# Patient Record
Sex: MALE | Race: WHITE | NOT HISPANIC OR LATINO | URBAN - METROPOLITAN AREA
[De-identification: names, ages, dates, MRNs, and addresses within clinical notes are randomized per-mention and may not be internally consistent; named-entity substitution may affect disease eponyms.]

---

## 2017-07-21 ENCOUNTER — OFFICE VISIT (OUTPATIENT)
Dept: URGENT CARE | Facility: CLINIC | Age: 47
End: 2017-07-21

## 2017-07-21 VITALS
HEIGHT: 69 IN | HEART RATE: 67 BPM | RESPIRATION RATE: 12 BRPM | SYSTOLIC BLOOD PRESSURE: 151 MMHG | OXYGEN SATURATION: 98 % | WEIGHT: 210 LBS | BODY MASS INDEX: 31.1 KG/M2 | TEMPERATURE: 98 F | DIASTOLIC BLOOD PRESSURE: 104 MMHG

## 2017-07-21 DIAGNOSIS — Z53.20 PROCEDURE NOT CARRIED OUT BECAUSE OF PATIENT'S DECISION: Primary | ICD-10-CM

## 2017-07-21 PROCEDURE — 99499 UNLISTED E&M SERVICE: CPT | Mod: S$GLB,,, | Performed by: SURGERY

## 2017-07-21 NOTE — PROGRESS NOTES
"Subjective:       Patient ID: Allie Grubbs is a 46 y.o. male.    Vitals:  height is 5' 9" (1.753 m) and weight is 95.3 kg (210 lb). His oral temperature is 97.7 °F (36.5 °C). His blood pressure is 151/104 (abnormal) and his pulse is 67. His respiration is 12 and oxygen saturation is 98%.     Chief Complaint: Cyst and Abscess    Cyst   This is a chronic problem. The current episode started more than 1 month ago. The problem occurs constantly. The problem has been gradually worsening. Associated symptoms include joint swelling and a visual change. Nothing aggravates the symptoms. He has tried nothing for the symptoms. The treatment provided no relief.   Abscess   Location:  Shoulder/arm  Treatments Tried:  Warm compresses and warm water soaks  Relieved by:  Nothing  Worsened by:  Nothing    Review of Systems   Musculoskeletal: Positive for joint swelling.       Objective:      Physical Exam    Assessment:       No diagnosis found.    Plan:         There are no diagnoses linked to this encounter.        The patient left the office before the visit was finished.  "

## 2021-03-26 ENCOUNTER — IMMUNIZATION (OUTPATIENT)
Dept: PRIMARY CARE CLINIC | Facility: CLINIC | Age: 51
End: 2021-03-26

## 2021-03-26 DIAGNOSIS — Z23 NEED FOR VACCINATION: Primary | ICD-10-CM

## 2021-03-26 PROCEDURE — 91300 PR SARS-COV- 2 COVID-19 VACCINE, NO PRSV, 30MCG/0.3ML, IM: CPT | Mod: S$GLB,,, | Performed by: INTERNAL MEDICINE

## 2021-03-26 PROCEDURE — 0001A PR IMMUNIZ ADMIN, SARS-COV-2 COVID-19 VACC, 30MCG/0.3ML, 1ST DOSE: ICD-10-PCS | Mod: CV19,S$GLB,, | Performed by: INTERNAL MEDICINE

## 2021-03-26 PROCEDURE — 91300 PR SARS-COV- 2 COVID-19 VACCINE, NO PRSV, 30MCG/0.3ML, IM: ICD-10-PCS | Mod: S$GLB,,, | Performed by: INTERNAL MEDICINE

## 2021-03-26 PROCEDURE — 0001A PR IMMUNIZ ADMIN, SARS-COV-2 COVID-19 VACC, 30MCG/0.3ML, 1ST DOSE: CPT | Mod: CV19,S$GLB,, | Performed by: INTERNAL MEDICINE

## 2021-03-26 RX ADMIN — Medication 0.3 ML: at 11:03

## 2021-04-18 ENCOUNTER — IMMUNIZATION (OUTPATIENT)
Dept: PRIMARY CARE CLINIC | Facility: CLINIC | Age: 51
End: 2021-04-18

## 2021-04-18 DIAGNOSIS — Z23 NEED FOR VACCINATION: Primary | ICD-10-CM

## 2021-04-18 PROCEDURE — 91300 PR SARS-COV- 2 COVID-19 VACCINE, NO PRSV, 30MCG/0.3ML, IM: CPT | Mod: S$GLB,,, | Performed by: INTERNAL MEDICINE

## 2021-04-18 PROCEDURE — 91300 PR SARS-COV- 2 COVID-19 VACCINE, NO PRSV, 30MCG/0.3ML, IM: ICD-10-PCS | Mod: S$GLB,,, | Performed by: INTERNAL MEDICINE

## 2021-04-18 PROCEDURE — 0002A PR IMMUNIZ ADMIN, SARS-COV-2 COVID-19 VACC, 30MCG/0.3ML, 2ND DOSE: CPT | Mod: CV19,S$GLB,, | Performed by: INTERNAL MEDICINE

## 2021-04-18 PROCEDURE — 0002A PR IMMUNIZ ADMIN, SARS-COV-2 COVID-19 VACC, 30MCG/0.3ML, 2ND DOSE: ICD-10-PCS | Mod: CV19,S$GLB,, | Performed by: INTERNAL MEDICINE

## 2021-04-18 RX ADMIN — Medication 0.3 ML: at 07:04

## 2022-06-27 ENCOUNTER — HOSPITAL ENCOUNTER (INPATIENT)
Facility: HOSPITAL | Age: 52
LOS: 1 days | Discharge: LEFT BEFORE DISCHARGE | DRG: 292 | End: 2022-06-27
Attending: EMERGENCY MEDICINE | Admitting: HOSPITALIST

## 2022-06-27 VITALS
TEMPERATURE: 98 F | HEART RATE: 82 BPM | WEIGHT: 180.5 LBS | HEIGHT: 69 IN | SYSTOLIC BLOOD PRESSURE: 133 MMHG | BODY MASS INDEX: 26.73 KG/M2 | DIASTOLIC BLOOD PRESSURE: 90 MMHG | RESPIRATION RATE: 16 BRPM | OXYGEN SATURATION: 94 %

## 2022-06-27 DIAGNOSIS — R06.09 DYSPNEA ON EXERTION: ICD-10-CM

## 2022-06-27 DIAGNOSIS — I50.9 NEW ONSET OF CONGESTIVE HEART FAILURE: ICD-10-CM

## 2022-06-27 DIAGNOSIS — R06.02 SOB (SHORTNESS OF BREATH): ICD-10-CM

## 2022-06-27 PROBLEM — J44.1 CHRONIC OBSTRUCTIVE PULMONARY DISEASE WITH ACUTE EXACERBATION: Status: ACTIVE | Noted: 2022-06-27

## 2022-06-27 PROBLEM — Z72.0 TOBACCO ABUSE: Status: ACTIVE | Noted: 2022-06-27

## 2022-06-27 PROBLEM — I50.41 ACUTE COMBINED SYSTOLIC AND DIASTOLIC CONGESTIVE HEART FAILURE: Status: ACTIVE | Noted: 2022-06-27

## 2022-06-27 LAB
ALBUMIN SERPL BCP-MCNC: 3.7 G/DL (ref 3.5–5.2)
ALLENS TEST: ABNORMAL
ALP SERPL-CCNC: 100 U/L (ref 55–135)
ALT SERPL W/O P-5'-P-CCNC: 13 U/L (ref 10–44)
ANION GAP SERPL CALC-SCNC: 11 MMOL/L (ref 8–16)
AST SERPL-CCNC: 13 U/L (ref 10–40)
AV INDEX (PROSTH): 0.67
AV MEAN GRADIENT: 4 MMHG
AV PEAK GRADIENT: 5 MMHG
AV VALVE AREA: 1.99 CM2
AV VELOCITY RATIO: 0.78
BASOPHILS # BLD AUTO: 0.07 K/UL (ref 0–0.2)
BASOPHILS NFR BLD: 0.6 % (ref 0–1.9)
BILIRUB SERPL-MCNC: 0.6 MG/DL (ref 0.1–1)
BNP SERPL-MCNC: 894 PG/ML (ref 0–99)
BSA FOR ECHO PROCEDURE: 1.97 M2
BUN SERPL-MCNC: 12 MG/DL (ref 6–20)
CALCIUM SERPL-MCNC: 9.4 MG/DL (ref 8.7–10.5)
CHLORIDE SERPL-SCNC: 105 MMOL/L (ref 95–110)
CO2 SERPL-SCNC: 22 MMOL/L (ref 23–29)
CREAT SERPL-MCNC: 0.9 MG/DL (ref 0.5–1.4)
CRP SERPL-MCNC: 19 MG/L (ref 0–8.2)
CTP QC/QA: YES
CV ECHO LV RWT: 0.33 CM
DIFFERENTIAL METHOD: ABNORMAL
DOP CALC AO PEAK VEL: 1.16 M/S
DOP CALC AO VTI: 21.91 CM
DOP CALC LVOT AREA: 3 CM2
DOP CALC LVOT DIAMETER: 1.95 CM
DOP CALC LVOT PEAK VEL: 0.91 M/S
DOP CALC LVOT STROKE VOLUME: 43.52 CM3
DOP CALCLVOT PEAK VEL VTI: 14.58 CM
E WAVE DECELERATION TIME: 123.22 MSEC
E/A RATIO: 3.03
ECHO LV POSTERIOR WALL: 0.93 CM (ref 0.6–1.1)
EJECTION FRACTION: 25 %
EOSINOPHIL # BLD AUTO: 0.2 K/UL (ref 0–0.5)
EOSINOPHIL NFR BLD: 1.9 % (ref 0–8)
ERYTHROCYTE [DISTWIDTH] IN BLOOD BY AUTOMATED COUNT: 13.4 % (ref 11.5–14.5)
EST. GFR  (AFRICAN AMERICAN): >60 ML/MIN/1.73 M^2
EST. GFR  (NON AFRICAN AMERICAN): >60 ML/MIN/1.73 M^2
ESTIMATED AVG GLUCOSE: 97 MG/DL (ref 68–131)
FRACTIONAL SHORTENING: 10 % (ref 28–44)
GLUCOSE SERPL-MCNC: 160 MG/DL (ref 70–110)
HBA1C MFR BLD: 5 % (ref 4–5.6)
HCO3 UR-SCNC: 26.2 MMOL/L (ref 24–28)
HCT VFR BLD AUTO: 46.2 % (ref 40–54)
HGB BLD-MCNC: 15.6 G/DL (ref 14–18)
IMM GRANULOCYTES # BLD AUTO: 0.04 K/UL (ref 0–0.04)
IMM GRANULOCYTES NFR BLD AUTO: 0.3 % (ref 0–0.5)
INTERVENTRICULAR SEPTUM: 1.35 CM (ref 0.6–1.1)
IVRT: 96.89 MSEC
LA MAJOR: 6.56 CM
LA MINOR: 7.24 CM
LA WIDTH: 4.67 CM
LACTATE SERPL-SCNC: 2.8 MMOL/L (ref 0.5–2.2)
LEFT ATRIUM SIZE: 5.64 CM
LEFT ATRIUM VOLUME INDEX: 79 ML/M2
LEFT ATRIUM VOLUME: 154.1 CM3
LEFT INTERNAL DIMENSION IN SYSTOLE: 5 CM (ref 2.1–4)
LEFT VENTRICLE DIASTOLIC VOLUME INDEX: 77.61 ML/M2
LEFT VENTRICLE DIASTOLIC VOLUME: 151.33 ML
LEFT VENTRICLE MASS INDEX: 133 G/M2
LEFT VENTRICLE SYSTOLIC VOLUME INDEX: 60.6 ML/M2
LEFT VENTRICLE SYSTOLIC VOLUME: 118.11 ML
LEFT VENTRICULAR INTERNAL DIMENSION IN DIASTOLE: 5.56 CM (ref 3.5–6)
LEFT VENTRICULAR MASS: 258.53 G
LV LATERAL E/E' RATIO: 12.44 M/S
LYMPHOCYTES # BLD AUTO: 1.9 K/UL (ref 1–4.8)
LYMPHOCYTES NFR BLD: 15.7 % (ref 18–48)
MAGNESIUM SERPL-MCNC: 2 MG/DL (ref 1.6–2.6)
MCH RBC QN AUTO: 31.4 PG (ref 27–31)
MCHC RBC AUTO-ENTMCNC: 33.8 G/DL (ref 32–36)
MCV RBC AUTO: 93 FL (ref 82–98)
MONOCYTES # BLD AUTO: 0.5 K/UL (ref 0.3–1)
MONOCYTES NFR BLD: 3.8 % (ref 4–15)
MV PEAK A VEL: 0.37 M/S
MV PEAK E VEL: 1.12 M/S
NEUTROPHILS # BLD AUTO: 9.4 K/UL (ref 1.8–7.7)
NEUTROPHILS NFR BLD: 77.7 % (ref 38–73)
NRBC BLD-RTO: 0 /100 WBC
PCO2 BLDA: 52.5 MMHG (ref 35–45)
PH SMN: 7.31 [PH] (ref 7.35–7.45)
PLATELET # BLD AUTO: 138 K/UL (ref 150–450)
PMV BLD AUTO: 11.4 FL (ref 9.2–12.9)
PO2 BLDA: 19 MMHG (ref 40–60)
POC BE: -2 MMOL/L
POC SATURATED O2: 24 % (ref 95–100)
POC TCO2: 28 MMOL/L (ref 24–29)
POTASSIUM SERPL-SCNC: 3.8 MMOL/L (ref 3.5–5.1)
PROCALCITONIN SERPL IA-MCNC: 0.02 NG/ML
PROT SERPL-MCNC: 7.4 G/DL (ref 6–8.4)
PV PEAK VELOCITY: 0.66 CM/S
RA MAJOR: 5.79 CM
RA PRESSURE: 8 MMHG
RA WIDTH: 4.88 CM
RBC # BLD AUTO: 4.97 M/UL (ref 4.6–6.2)
RIGHT VENTRICULAR END-DIASTOLIC DIMENSION: 4.04 CM
SAMPLE: ABNORMAL
SARS-COV-2 RDRP RESP QL NAA+PROBE: NEGATIVE
SINUS: 3.34 CM
SITE: ABNORMAL
SODIUM SERPL-SCNC: 138 MMOL/L (ref 136–145)
STJ: 2.41 CM
TDI LATERAL: 0.09 M/S
TRICUSPID ANNULAR PLANE SYSTOLIC EXCURSION: 1.68 CM
TROPONIN I SERPL DL<=0.01 NG/ML-MCNC: 0.01 NG/ML (ref 0–0.03)
TROPONIN I SERPL DL<=0.01 NG/ML-MCNC: 0.01 NG/ML (ref 0–0.03)
WBC # BLD AUTO: 12.14 K/UL (ref 3.9–12.7)

## 2022-06-27 PROCEDURE — 25000003 PHARM REV CODE 250: Performed by: HOSPITALIST

## 2022-06-27 PROCEDURE — U0002 COVID-19 LAB TEST NON-CDC: HCPCS | Performed by: EMERGENCY MEDICINE

## 2022-06-27 PROCEDURE — 83880 ASSAY OF NATRIURETIC PEPTIDE: CPT | Performed by: EMERGENCY MEDICINE

## 2022-06-27 PROCEDURE — 96374 THER/PROPH/DIAG INJ IV PUSH: CPT

## 2022-06-27 PROCEDURE — 80053 COMPREHEN METABOLIC PANEL: CPT | Performed by: EMERGENCY MEDICINE

## 2022-06-27 PROCEDURE — 93005 ELECTROCARDIOGRAM TRACING: CPT

## 2022-06-27 PROCEDURE — 83735 ASSAY OF MAGNESIUM: CPT | Performed by: EMERGENCY MEDICINE

## 2022-06-27 PROCEDURE — 63600175 PHARM REV CODE 636 W HCPCS: Performed by: HOSPITALIST

## 2022-06-27 PROCEDURE — 99223 PR INITIAL HOSPITAL CARE,LEVL III: ICD-10-PCS | Mod: 25,,, | Performed by: INTERNAL MEDICINE

## 2022-06-27 PROCEDURE — 84145 PROCALCITONIN (PCT): CPT | Performed by: EMERGENCY MEDICINE

## 2022-06-27 PROCEDURE — 84484 ASSAY OF TROPONIN QUANT: CPT | Mod: 91 | Performed by: EMERGENCY MEDICINE

## 2022-06-27 PROCEDURE — 99900035 HC TECH TIME PER 15 MIN (STAT)

## 2022-06-27 PROCEDURE — 25000242 PHARM REV CODE 250 ALT 637 W/ HCPCS: Performed by: HOSPITALIST

## 2022-06-27 PROCEDURE — 93010 EKG 12-LEAD: ICD-10-PCS | Mod: ,,, | Performed by: INTERNAL MEDICINE

## 2022-06-27 PROCEDURE — 93010 ELECTROCARDIOGRAM REPORT: CPT | Mod: ,,, | Performed by: INTERNAL MEDICINE

## 2022-06-27 PROCEDURE — 94640 AIRWAY INHALATION TREATMENT: CPT

## 2022-06-27 PROCEDURE — 11000001 HC ACUTE MED/SURG PRIVATE ROOM

## 2022-06-27 PROCEDURE — 83036 HEMOGLOBIN GLYCOSYLATED A1C: CPT | Performed by: EMERGENCY MEDICINE

## 2022-06-27 PROCEDURE — 84484 ASSAY OF TROPONIN QUANT: CPT | Performed by: INTERNAL MEDICINE

## 2022-06-27 PROCEDURE — 96375 TX/PRO/DX INJ NEW DRUG ADDON: CPT

## 2022-06-27 PROCEDURE — 85025 COMPLETE CBC W/AUTO DIFF WBC: CPT | Performed by: EMERGENCY MEDICINE

## 2022-06-27 PROCEDURE — 82803 BLOOD GASES ANY COMBINATION: CPT

## 2022-06-27 PROCEDURE — 63600175 PHARM REV CODE 636 W HCPCS: Performed by: EMERGENCY MEDICINE

## 2022-06-27 PROCEDURE — 99223 1ST HOSP IP/OBS HIGH 75: CPT | Mod: 25,,, | Performed by: INTERNAL MEDICINE

## 2022-06-27 PROCEDURE — 99285 EMERGENCY DEPT VISIT HI MDM: CPT | Mod: 25

## 2022-06-27 PROCEDURE — 86140 C-REACTIVE PROTEIN: CPT | Performed by: EMERGENCY MEDICINE

## 2022-06-27 PROCEDURE — 83605 ASSAY OF LACTIC ACID: CPT | Performed by: EMERGENCY MEDICINE

## 2022-06-27 PROCEDURE — 94761 N-INVAS EAR/PLS OXIMETRY MLT: CPT

## 2022-06-27 PROCEDURE — 25000242 PHARM REV CODE 250 ALT 637 W/ HCPCS: Performed by: EMERGENCY MEDICINE

## 2022-06-27 RX ORDER — LEVALBUTEROL 1.25 MG/.5ML
1.25 SOLUTION, CONCENTRATE RESPIRATORY (INHALATION) EVERY 8 HOURS
Status: DISCONTINUED | OUTPATIENT
Start: 2022-06-27 | End: 2022-06-27 | Stop reason: HOSPADM

## 2022-06-27 RX ORDER — FUROSEMIDE 10 MG/ML
40 INJECTION INTRAMUSCULAR; INTRAVENOUS
Status: COMPLETED | OUTPATIENT
Start: 2022-06-27 | End: 2022-06-27

## 2022-06-27 RX ORDER — HYDRALAZINE HYDROCHLORIDE 20 MG/ML
5 INJECTION INTRAMUSCULAR; INTRAVENOUS
Status: DISCONTINUED | OUTPATIENT
Start: 2022-06-27 | End: 2022-06-27

## 2022-06-27 RX ORDER — ACETAMINOPHEN, ASPIRIN (NSAID), AND CAFFEINE 250; 250; 65 MG/1; MG/1; MG/1
1 TABLET, FILM COATED ORAL EVERY 6 HOURS PRN
COMMUNITY

## 2022-06-27 RX ORDER — FAMOTIDINE 20 MG/1
20 TABLET, FILM COATED ORAL 2 TIMES DAILY
Status: DISCONTINUED | OUTPATIENT
Start: 2022-06-27 | End: 2022-06-27 | Stop reason: HOSPADM

## 2022-06-27 RX ORDER — METHYLPREDNISOLONE SOD SUCC 125 MG
80 VIAL (EA) INJECTION
Status: COMPLETED | OUTPATIENT
Start: 2022-06-27 | End: 2022-06-27

## 2022-06-27 RX ORDER — IPRATROPIUM BROMIDE 0.5 MG/2.5ML
1 SOLUTION RESPIRATORY (INHALATION) ONCE
Status: COMPLETED | OUTPATIENT
Start: 2022-06-27 | End: 2022-06-27

## 2022-06-27 RX ORDER — ALBUTEROL SULFATE 2.5 MG/.5ML
5 SOLUTION RESPIRATORY (INHALATION)
Status: COMPLETED | OUTPATIENT
Start: 2022-06-27 | End: 2022-06-27

## 2022-06-27 RX ORDER — SODIUM CHLORIDE 0.9 % (FLUSH) 0.9 %
10 SYRINGE (ML) INJECTION
Status: DISCONTINUED | OUTPATIENT
Start: 2022-06-27 | End: 2022-06-27 | Stop reason: HOSPADM

## 2022-06-27 RX ORDER — PREDNISONE 50 MG/1
50 TABLET ORAL DAILY
Status: DISCONTINUED | OUTPATIENT
Start: 2022-06-27 | End: 2022-06-27 | Stop reason: HOSPADM

## 2022-06-27 RX ORDER — ENOXAPARIN SODIUM 100 MG/ML
40 INJECTION SUBCUTANEOUS EVERY 24 HOURS
Status: DISCONTINUED | OUTPATIENT
Start: 2022-06-27 | End: 2022-06-27 | Stop reason: HOSPADM

## 2022-06-27 RX ADMIN — LEVALBUTEROL 1.25 MG: 1.25 SOLUTION, CONCENTRATE RESPIRATORY (INHALATION) at 04:06

## 2022-06-27 RX ADMIN — ALBUTEROL SULFATE 5 MG: 2.5 SOLUTION RESPIRATORY (INHALATION) at 06:06

## 2022-06-27 RX ADMIN — IPRATROPIUM BROMIDE 1 MG: 0.5 SOLUTION RESPIRATORY (INHALATION) at 06:06

## 2022-06-27 RX ADMIN — FUROSEMIDE 40 MG: 10 INJECTION, SOLUTION INTRAMUSCULAR; INTRAVENOUS at 08:06

## 2022-06-27 RX ADMIN — METHYLPREDNISOLONE SODIUM SUCCINATE 80 MG: 125 INJECTION, POWDER, FOR SOLUTION INTRAMUSCULAR; INTRAVENOUS at 07:06

## 2022-06-27 RX ADMIN — PREDNISONE 50 MG: 5 TABLET ORAL at 11:06

## 2022-06-27 RX ADMIN — FAMOTIDINE 20 MG: 20 TABLET ORAL at 11:06

## 2022-06-27 NOTE — ED TRIAGE NOTES
Pt came in via triage Cc generalized weakness , sob with cough x 2 weeks. Pt state he smokes everyday

## 2022-06-27 NOTE — SUBJECTIVE & OBJECTIVE
History reviewed. No pertinent past medical history.    History reviewed. No pertinent surgical history.    Review of patient's allergies indicates:   Allergen Reactions    Penicillins Rash       No current facility-administered medications on file prior to encounter.     Current Outpatient Medications on File Prior to Encounter   Medication Sig    aspirin-acetaminophen-caffeine 250-250-65 mg (EXCEDRIN EXTRA STRENGTH) 250-250-65 mg per tablet Take 1 tablet by mouth every 6 (six) hours as needed for Pain.     Family History    None       Tobacco Use    Smoking status: Current Every Day Smoker    Smokeless tobacco: Never Used   Substance and Sexual Activity    Alcohol use: No    Drug use: No    Sexual activity: Not on file     Review of Systems   Constitutional: Negative.   HENT: Negative.     Eyes: Negative.    Cardiovascular:  Positive for dyspnea on exertion.   Respiratory:  Positive for shortness of breath.    Endocrine: Negative.    Hematologic/Lymphatic: Negative.    Skin: Negative.    Musculoskeletal: Negative.    Gastrointestinal: Negative.    Genitourinary: Negative.    Neurological: Negative.    Psychiatric/Behavioral: Negative.     Allergic/Immunologic: Negative.    Objective:     Vital Signs (Most Recent):  Temp: 97.7 °F (36.5 °C) (06/27/22 1257)  Pulse: 90 (06/27/22 1257)  Resp: 18 (06/27/22 1257)  BP: (!) 130/91 (06/27/22 1257)  SpO2: (!) 94 % (06/27/22 1257)   Vital Signs (24h Range):  Temp:  [97.7 °F (36.5 °C)-97.9 °F (36.6 °C)] 97.7 °F (36.5 °C)  Pulse:  [] 90  Resp:  [10-23] 18  SpO2:  [93 %-97 %] 94 %  BP: (128-162)/() 130/91     Weight: 81.9 kg (180 lb 8 oz)  Body mass index is 26.64 kg/m².    SpO2: (!) 94 %  O2 Device (Oxygen Therapy): (S) room air    No intake or output data in the 24 hours ending 06/27/22 1321    Lines/Drains/Airways       Peripheral Intravenous Line  Duration                  Peripheral IV - Single Lumen 06/27/22 0557 20 G Right Antecubital <1 day                     Physical Exam  Vitals reviewed.   Constitutional:       Appearance: He is well-developed.   HENT:      Head: Normocephalic.   Eyes:      Conjunctiva/sclera: Conjunctivae normal.      Pupils: Pupils are equal, round, and reactive to light.   Cardiovascular:      Rate and Rhythm: Normal rate and regular rhythm.      Heart sounds: Normal heart sounds.   Pulmonary:      Effort: Pulmonary effort is normal.      Breath sounds: Normal breath sounds.   Abdominal:      General: Bowel sounds are normal.      Palpations: Abdomen is soft.   Musculoskeletal:      Cervical back: Normal range of motion and neck supple.   Skin:     General: Skin is warm.   Neurological:      Mental Status: He is alert and oriented to person, place, and time.       Significant Labs: BMP:   Recent Labs   Lab 06/27/22  0610   *      K 3.8      CO2 22*   BUN 12   CREATININE 0.9   CALCIUM 9.4   MG 2.0   , CMP   Recent Labs   Lab 06/27/22  0610      K 3.8      CO2 22*   *   BUN 12   CREATININE 0.9   CALCIUM 9.4   PROT 7.4   ALBUMIN 3.7   BILITOT 0.6   ALKPHOS 100   AST 13   ALT 13   ANIONGAP 11   ESTGFRAFRICA >60   EGFRNONAA >60   , CBC   Recent Labs   Lab 06/27/22  0610   WBC 12.14   HGB 15.6   HCT 46.2   *   , INR No results for input(s): INR, PROTIME in the last 48 hours., Lipid Panel No results for input(s): CHOL, HDL, LDLCALC, TRIG, CHOLHDL in the last 48 hours., Troponin   Recent Labs   Lab 06/27/22  0610 06/27/22  1051   TROPONINI 0.009 0.006   , and All pertinent lab results from the last 24 hours have been reviewed.    Significant Imaging: Echocardiogram: Transthoracic echo (TTE) complete (Cupid Only):   Results for orders placed or performed during the hospital encounter of 06/27/22   Echo   Result Value Ref Range    BSA 1.97 m2    LV LATERAL E/E' RATIO 12.44 m/s    LA WIDTH 4.67 cm    TDI LATERAL 0.09 m/s    PV PEAK VELOCITY 0.66 cm/s    LVIDd 5.56 3.5 - 6.0 cm    IVS 1.35 (A) 0.6 - 1.1 cm     Posterior Wall 0.93 0.6 - 1.1 cm    LVIDs 5.00 (A) 2.1 - 4.0 cm    FS 10 28 - 44 %    LA volume 154.10 cm3    Sinus 3.34 cm    STJ 2.41 cm    LV mass 258.53 g    LA size 5.64 cm    RVDD 4.04 cm    TAPSE 1.68 cm    Left Ventricle Relative Wall Thickness 0.33 cm    AV mean gradient 4 mmHg    AV valve area 1.99 cm2    AV Velocity Ratio 0.78     AV index (prosthetic) 0.67     E/A ratio 3.03     E wave deceleration time 123.22 msec    IVRT 96.89 msec    LVOT diameter 1.95 cm    LVOT area 3.0 cm2    LVOT peak alfie 0.91 m/s    LVOT peak VTI 14.58 cm    Ao peak alfie 1.16 m/s    Ao VTI 21.91 cm    LVOT stroke volume 43.52 cm3    AV peak gradient 5 mmHg    MV Peak E Alfie 1.12 m/s    MV Peak A Alfie 0.37 m/s    LV Systolic Volume 118.11 mL    LV Systolic Volume Index 60.6 mL/m2    LV Diastolic Volume 151.33 mL    LV Diastolic Volume Index 77.61 mL/m2    LA Volume Index 79.0 mL/m2    LV Mass Index 133 g/m2    RA Major Axis 5.79 cm    Left Atrium Minor Axis 7.24 cm    Left Atrium Major Axis 6.56 cm    RA Width 4.88 cm    Right Atrial Pressure (from IVC) 8 mmHg    EF 25 %    Narrative    · The left ventricle is normal in size with severely decreased systolic   function.  · Severe left atrial enlargement.  · The estimated ejection fraction is 25%.  · Grade III left ventricular diastolic dysfunction.  · Mild right ventricular enlargement.  · Mild right atrial enlargement.  · Intermediate central venous pressure (8 mmHg).

## 2022-06-27 NOTE — SUBJECTIVE & OBJECTIVE
History reviewed. No pertinent past medical history.    History reviewed. No pertinent surgical history.    Review of patient's allergies indicates:   Allergen Reactions    Penicillins Rash       No current facility-administered medications on file prior to encounter.     No current outpatient medications on file prior to encounter.     Family History    None       Tobacco Use    Smoking status: Current Every Day Smoker    Smokeless tobacco: Never Used   Substance and Sexual Activity    Alcohol use: No    Drug use: No    Sexual activity: Not on file     Review of Systems   Constitutional:  Positive for activity change and appetite change.   HENT:  Negative for congestion and dental problem.    Eyes:  Negative for discharge and itching.   Respiratory:  Positive for shortness of breath.    Cardiovascular:  Negative for chest pain and leg swelling.   Gastrointestinal:  Negative for abdominal distention and abdominal pain.   Endocrine: Negative for cold intolerance and heat intolerance.   Genitourinary:  Negative for difficulty urinating and dysuria.   Musculoskeletal:  Negative for arthralgias and back pain.   Skin:  Negative for color change and pallor.   Allergic/Immunologic: Negative for food allergies.   Neurological:  Negative for dizziness and facial asymmetry.   Hematological:  Negative for adenopathy. Does not bruise/bleed easily.   Psychiatric/Behavioral:  Negative for agitation and behavioral problems.    Objective:     Vital Signs (Most Recent):  Temp: 97.9 °F (36.6 °C) (06/27/22 0531)  Pulse: 94 (06/27/22 1002)  Resp: 14 (06/27/22 1002)  BP: (!) 146/103 (06/27/22 1002)  SpO2: 96 % (06/27/22 1002)   Vital Signs (24h Range):  Temp:  [97.9 °F (36.6 °C)] 97.9 °F (36.6 °C)  Pulse:  [] 94  Resp:  [14-23] 14  SpO2:  [93 %-97 %] 96 %  BP: (146-162)/(101-129) 146/103     Weight: 79.4 kg (175 lb)  Body mass index is 25.84 kg/m².    Physical Exam  Constitutional:       Appearance: Normal appearance.    HENT:      Head: Normocephalic and atraumatic.      Nose: Nose normal.      Mouth/Throat:      Mouth: Mucous membranes are dry.   Eyes:      Extraocular Movements: Extraocular movements intact.      Pupils: Pupils are equal, round, and reactive to light.   Cardiovascular:      Rate and Rhythm: Normal rate and regular rhythm.   Pulmonary:      Effort: Pulmonary effort is normal.      Breath sounds: Rales present.   Abdominal:      General: Abdomen is flat. There is no distension.      Palpations: Abdomen is soft.      Tenderness: There is no abdominal tenderness.   Musculoskeletal:         General: No swelling or deformity.      Cervical back: Normal range of motion and neck supple.   Skin:     General: Skin is warm.      Coloration: Skin is not jaundiced.      Findings: No bruising.   Neurological:      Mental Status: He is alert and oriented to person, place, and time.      Cranial Nerves: No cranial nerve deficit.   Psychiatric:         Mood and Affect: Mood normal.         Behavior: Behavior normal.         CRANIAL NERVES     CN III, IV, VI   Pupils are equal, round, and reactive to light.     Significant Labs: All pertinent labs within the past 24 hours have been reviewed.  BMP:   Recent Labs   Lab 06/27/22  0610   *      K 3.8      CO2 22*   BUN 12   CREATININE 0.9   CALCIUM 9.4   MG 2.0     CBC:   Recent Labs   Lab 06/27/22  0610   WBC 12.14   HGB 15.6   HCT 46.2   *     CMP:   Recent Labs   Lab 06/27/22  0610      K 3.8      CO2 22*   *   BUN 12   CREATININE 0.9   CALCIUM 9.4   PROT 7.4   ALBUMIN 3.7   BILITOT 0.6   ALKPHOS 100   AST 13   ALT 13   ANIONGAP 11   EGFRNONAA >60       Significant Imaging: I have reviewed all pertinent imaging results/findings within the past 24 hours.

## 2022-06-27 NOTE — CONSULTS
.Food & Nutrition  Education    Diet Education: Low NA FR  Time Spent: 15 MInutes  Learners: Patient      Nutrition Education provided with handouts:   Heart Failure Nutrition Therapy  + Clinical Reference attachments to d/c documents      Comments:  Spoke with pt, eats general diet, used a lot of salt and drinks lots of fluids per pt works outside in the heat. Discussed Na restriction, Discussed need for monitoring fluid intake and to discuss with cardiologist as well given profession. Discussed daily weights as a way to monitor fluid retention. Handouts provided, no further questions.      All questions and concerns answered. Dietitian's contact information provided.   Please Re-consult as needed  Thanks!

## 2022-06-27 NOTE — ASSESSMENT & PLAN NOTE
Patient is identified as having Combined Systolic and Diastolic heart failure that is Acute. CHF is currently uncontrolled. Latest ECHO performed and demonstrates- Results for orders placed during the hospital encounter of 06/27/22    Echo    Interpretation Summary  · The left ventricle is normal in size with severely decreased systolic function.  · Severe left atrial enlargement.  · The estimated ejection fraction is 25%.  · Grade III left ventricular diastolic dysfunction.  · Mild right ventricular enlargement.  · Mild right atrial enlargement.  · Intermediate central venous pressure (8 mmHg).  . Continue Furosemide and monitor clinical status closely. Monitor on telemetry. Patient is on CHF pathway.  Monitor strict Is&Os and daily weights.  Place on fluid restriction of 1.5 L. Continue to stress to patient importance of self efficacy and  on diet for CHF. Last BNP reviewed- and noted below   Recent Labs   Lab 06/27/22  0610   *   .      Once patient euvolemic, plan for cardiac catheterization for ischemic evaluation.  Potentially in a.m..

## 2022-06-27 NOTE — PHARMACY MED REC
"Admission Medication History     The home medication history was taken by Abby Henriquez CPhT.    You may go to "Admission" then "Reconcile Home Medications" tabs to review and/or act upon these items.      The home medication list has been updated by the Pharmacy department.    Please read ALL comments highlighted in yellow.    Please address this information as you see fit.     Feel free to contact us if you have any questions or require assistance.        Medications listed below were obtained from: Patient/family and Analytic software- [employer]  (Not in a hospital admission)        Abby Henriquez CPhT.  [phone]                    .          "

## 2022-06-27 NOTE — ASSESSMENT & PLAN NOTE
.patient has severe dyspnea on exertion,he has BNP of 894 and chest X ray pulmonary vascular congestion,recieved IV lasix,echo is pending,started on CHF protocol.

## 2022-06-27 NOTE — HPI
51-year-old male with a reported past medical history of tobacco abuse for over 30 years,presents with shortness of breath, weakness x2 weeks.  Patient reports his shortness of breath has gotten a lot worse, states that he feels like cannot get a full breath in.  Reports coughing up some frothy looking sputum, states that he cannot lay down flat or on his left side.  Reports no significant swelling in his legs, states he feels exhausted when walking short distances and has to stop to catch his breath.  He reports continuing to smoke and has noticed that he has been wheezing lately.  He denies any fevers/chills, abdominal pain, nausea/vomiting/diarrhea, or any further associated symptoms.patient has severe dyspnea on exertion,he has BNP of 894 and chest X ray pulmonary vascular congestion,patient received IV solumedrol,nebulizer and IV lasix with improvement in his symptoms.

## 2022-06-27 NOTE — ED PROVIDER NOTES
Encounter Date: 6/27/2022       History     Chief Complaint   Patient presents with    Shortness of Breath    Weakness     Pt presents to ED c/o sob and weakness x 2 weeks progressively worsened today.  Pt reports coughing and chest congestion. Denies cp, ha, dizziness, numbness or tingling.  Denies asthma or COPD.  Pain 0/10.      HPI     51-year-old male with no reported past medical history presents with shortness of breath, weakness x2 weeks.  Patient reports his shortness of breath has gotten a lot worse, states that he feels like cannot get a full breath in.  Reports coughing up some frothy looking sputum, states that he cannot lay down flat or on his left side.  Reports no significant swelling in his legs, states he feels exhausted when walking short distances and has to stop to catch his breath.  He reports continuing to smoke and has noticed that he has been wheezing lately.  He denies any fevers/chills, abdominal pain, nausea/vomiting/diarrhea, or any further associated symptoms.    Review of patient's allergies indicates:   Allergen Reactions    Penicillins Rash     History reviewed. No pertinent past medical history.  History reviewed. No pertinent surgical history.  History reviewed. No pertinent family history.  Social History     Tobacco Use    Smoking status: Current Every Day Smoker    Smokeless tobacco: Never Used   Substance Use Topics    Alcohol use: No    Drug use: No     Review of Systems   Constitutional: Positive for fatigue.   HENT: Negative.    Eyes: Negative.    Respiratory: Positive for shortness of breath.    Cardiovascular: Negative.    Gastrointestinal: Negative.    Genitourinary: Negative.    Musculoskeletal: Negative.    Skin: Negative.    Neurological: Positive for weakness.       Physical Exam     Initial Vitals [06/27/22 0531]   BP Pulse Resp Temp SpO2   (!) 157/109 92 20 97.9 °F (36.6 °C) 95 %      MAP       --         Physical Exam    Nursing note and vitals  reviewed.  Constitutional: He appears well-developed and well-nourished. He is not diaphoretic. No distress.   HENT:   Head: Normocephalic and atraumatic.   Nose: Nose normal.   Mouth/Throat: No oropharyngeal exudate.   Eyes: EOM are normal. Pupils are equal, round, and reactive to light.   Neck: Neck supple. No tracheal deviation present. No JVD present.   Normal range of motion.  Cardiovascular: Regular rhythm, normal heart sounds and intact distal pulses.   Tachycardic   Pulmonary/Chest: He is in respiratory distress (mild tachypnea). He has wheezes (diffuse wheezing noted, prolonged expiratory phase noted). He has no rhonchi. He has no rales.   Abdominal: Abdomen is soft. Bowel sounds are normal. He exhibits no distension. There is no abdominal tenderness. There is no rebound and no guarding.   Musculoskeletal:         General: Edema (trace) present. No tenderness. Normal range of motion.      Cervical back: Normal range of motion and neck supple.     Neurological: He is alert and oriented to person, place, and time. He has normal strength.   Skin: Skin is warm and dry. Capillary refill takes less than 2 seconds. No rash noted. No erythema.         ED Course   Procedures  Labs Reviewed   CBC W/ AUTO DIFFERENTIAL - Abnormal; Notable for the following components:       Result Value    MCH 31.4 (*)     Platelets 138 (*)     Gran # (ANC) 9.4 (*)     Gran % 77.7 (*)     Lymph % 15.7 (*)     Mono % 3.8 (*)     All other components within normal limits   COMPREHENSIVE METABOLIC PANEL - Abnormal; Notable for the following components:    CO2 22 (*)     Glucose 160 (*)     All other components within normal limits   C-REACTIVE PROTEIN - Abnormal; Notable for the following components:    CRP 19.0 (*)     All other components within normal limits   LACTIC ACID, PLASMA - Abnormal; Notable for the following components:    Lactate (Lactic Acid) 2.8 (*)     All other components within normal limits   B-TYPE NATRIURETIC PEPTIDE  - Abnormal; Notable for the following components:     (*)     All other components within normal limits   ISTAT PROCEDURE - Abnormal; Notable for the following components:    POC PH 7.307 (*)     POC PCO2 52.5 (*)     POC PO2 19 (*)     POC SATURATED O2 24 (*)     All other components within normal limits   SARS-COV-2 RDRP GENE - Normal   TROPONIN I   PROCALCITONIN   HEMOGLOBIN A1C   MAGNESIUM   MAGNESIUM   HEMOGLOBIN A1C          Imaging Results          X-Ray Chest AP Portable (Final result)  Result time 06/27/22 06:32:47    Final result by Xander Solorzano DO (06/27/22 06:32:47)                 Impression:      Please see above      Electronically signed by: Xander Solorzano DO  Date:    06/27/2022  Time:    06:32             Narrative:    EXAMINATION:  XR CHEST AP PORTABLE    CLINICAL HISTORY:  SOB;    TECHNIQUE:  Single frontal view of the chest was performed.    COMPARISON:  None    FINDINGS:  Ill-defined interstitial opacities within the lungs bilaterally most pronounced in the hilar and lower lobes greater on the right which are nonspecific subsegmental airspace process not excluded.  There is no large pleural effusion or pneumothorax.  Monitoring leads overlie the thorax.  Visualized osseous structures grossly intact.  Further evaluation as warranted clinically.                                 Medications   levalbuterol nebulizer solution 1.25 mg (has no administration in time range)   sodium chloride 0.9% flush 10 mL (has no administration in time range)   albuterol sulfate nebulizer solution 5 mg (5 mg Nebulization Given 6/27/22 0647)   ipratropium 0.02 % nebulizer solution 1 mg (1 mg Nebulization Given 6/27/22 0647)   methylPREDNISolone sodium succinate injection 80 mg (80 mg Intravenous Given 6/27/22 0733)   furosemide injection 40 mg (40 mg Intravenous Given 6/27/22 0800)                 ED Course as of 06/27/22 0937   Mon Jun 27, 2022   0637 EKG-528-sinus tachycardia rate of 109 beats per minute,  nonspecific ST and T-wave changes noted inferiorly, laterally, no STEMI. [BB]      ED Course User Index  [BB] [place] Figueroa MD          MDM:    51 y.o.male with PMHx as noted above, presents with SOB. Physical exam as noted above.  ED workup remarkable for EKG - sinus tachycardia, rate 109 bpm, nonspecific ST and T-wave changes noted, no STEMI, trop - 0.009, BNP - 894, pH/pCO2 - 7.307/52.5, CBC/CMP - wnl, LA 2.8, CRP 19.0, CXR - interstitial opacities.  Pt presentation concerning for new onset heart failure with history provided consistent however patient also with a slight respiratory acidosis and long smoking history more consistent with possible COPD.  Patient treated for both with albuterol, Lasix, Solu-Medrol, little improvement after albuterol neb, will continue treatment with diuretics.  Patient is ambulatory to around 50 ft before having to stop due to his dyspnea, O2 sats dropping to 90-93% and patient's tachycardia greater than 100 beats per minute.  At this time given patient's history, physical exam, and ED workup do not suspect arrhythmia, CHF exacerbation, COPD exacerbation, cardiac tamponade, MI/ACS, PE, PTX, aortic dissection, pericarditis, PNA, or any further malignant cause.  Discussed further with Hospital Medicine team, will admit for further evaluation and and treatment.          Clinical Impression:   Final diagnoses:  [R06.02] SOB (shortness of breath)  [R06.00] Dyspnea on exertion  [I50.9] New onset of congestive heart failure          ED Disposition Condition    Admit               [place] Figueroa MD  06/27/22 0937

## 2022-06-27 NOTE — CONSULTS
[facility] Surg  Cardiology  Consult Note    Patient Name: Allie Grubbs  MRN: [id number]  Admission Date: 6/27/2022  Hospital Length of Stay: 0 days  Code Status: Full Code   Attending Provider: Dionisio Johansen MD   Consulting Provider: Artemio Armstrong MD  Primary Care Physician: Primary Doctor No  Principal Problem:Acute combined systolic and diastolic congestive heart failure    Patient information was obtained from patient and ER records.     Inpatient consult to Cardiology  Consult performed by: Artemio Armstrong MD  Consult ordered by: North Figueroa MD        Subjective:     Chief Complaint:  sob     HPI:   51-year-old male with a reported past medical history of tobacco abuse for over 30 years,presents with shortness of breath, weakness x2 weeks.  Patient reports his shortness of breath has gotten a lot worse, states that he feels like cannot get a full breath in.  Reports coughing up some frothy looking sputum, states that he cannot lay down flat or on his left side.  Reports no significant swelling in his legs, states he feels exhausted when walking short distances and has to stop to catch his breath.  He reports continuing to smoke and has noticed that he has been wheezing lately.  He denies any fevers/chills, abdominal pain, nausea/vomiting/diarrhea, or any further associated symptoms.patient has severe dyspnea on exertion,he has BNP of 894 and chest X ray pulmonary vascular congestion,patient received IV solumedrol,nebulizer and IV lasix with improvement in his symptoms.          History reviewed. No pertinent past medical history.    History reviewed. No pertinent surgical history.    Review of patient's allergies indicates:   Allergen Reactions    Penicillins Rash       No current facility-administered medications on file prior to encounter.     Current Outpatient Medications on File Prior to Encounter   Medication Sig    aspirin-acetaminophen-caffeine 250-250-65 mg (EXCEDRIN EXTRA  STRENGTH) 250-250-65 mg per tablet Take 1 tablet by mouth every 6 (six) hours as needed for Pain.     Family History    None       Tobacco Use    Smoking status: Current Every Day Smoker    Smokeless tobacco: Never Used   Substance and Sexual Activity    Alcohol use: No    Drug use: No    Sexual activity: Not on file     Review of Systems   Constitutional: Negative.   HENT: Negative.     Eyes: Negative.    Cardiovascular:  Positive for dyspnea on exertion.   Respiratory:  Positive for shortness of breath.    Endocrine: Negative.    Hematologic/Lymphatic: Negative.    Skin: Negative.    Musculoskeletal: Negative.    Gastrointestinal: Negative.    Genitourinary: Negative.    Neurological: Negative.    Psychiatric/Behavioral: Negative.     Allergic/Immunologic: Negative.    Objective:     Vital Signs (Most Recent):  Temp: 97.7 °F (36.5 °C) (06/27/22 1257)  Pulse: 90 (06/27/22 1257)  Resp: 18 (06/27/22 1257)  BP: (!) 130/91 (06/27/22 1257)  SpO2: (!) 94 % (06/27/22 1257)   Vital Signs (24h Range):  Temp:  [97.7 °F (36.5 °C)-97.9 °F (36.6 °C)] 97.7 °F (36.5 °C)  Pulse:  [] 90  Resp:  [10-23] 18  SpO2:  [93 %-97 %] 94 %  BP: (128-162)/() 130/91     Weight: 81.9 kg (180 lb 8 oz)  Body mass index is 26.64 kg/m².    SpO2: (!) 94 %  O2 Device (Oxygen Therapy): (S) room air    No intake or output data in the 24 hours ending 06/27/22 1321    Lines/Drains/Airways       Peripheral Intravenous Line  Duration                  Peripheral IV - Single Lumen 06/27/22 0557 20 G Right Antecubital <1 day                    Physical Exam  Vitals reviewed.   Constitutional:       Appearance: He is well-developed.   HENT:      Head: Normocephalic.   Eyes:      Conjunctiva/sclera: Conjunctivae normal.      Pupils: Pupils are equal, round, and reactive to light.   Cardiovascular:      Rate and Rhythm: Normal rate and regular rhythm.      Heart sounds: Normal heart sounds.   Pulmonary:      Effort: Pulmonary effort is normal.       Breath sounds: Normal breath sounds.   Abdominal:      General: Bowel sounds are normal.      Palpations: Abdomen is soft.   Musculoskeletal:      Cervical back: Normal range of motion and neck supple.   Skin:     General: Skin is warm.   Neurological:      Mental Status: He is alert and oriented to person, place, and time.       Significant Labs: BMP:   Recent Labs   Lab 06/27/22  0610   *      K 3.8      CO2 22*   BUN 12   CREATININE 0.9   CALCIUM 9.4   MG 2.0   , CMP   Recent Labs   Lab 06/27/22  0610      K 3.8      CO2 22*   *   BUN 12   CREATININE 0.9   CALCIUM 9.4   PROT 7.4   ALBUMIN 3.7   BILITOT 0.6   ALKPHOS 100   AST 13   ALT 13   ANIONGAP 11   ESTGFRAFRICA >60   EGFRNONAA >60   , CBC   Recent Labs   Lab 06/27/22  0610   WBC 12.14   HGB 15.6   HCT 46.2   *   , INR No results for input(s): INR, PROTIME in the last 48 hours., Lipid Panel No results for input(s): CHOL, HDL, LDLCALC, TRIG, CHOLHDL in the last 48 hours., Troponin   Recent Labs   Lab 06/27/22  0610 06/27/22  1051   TROPONINI 0.009 0.006   , and All pertinent lab results from the last 24 hours have been reviewed.    Significant Imaging: Echocardiogram: Transthoracic echo (TTE) complete (Cupid Only):   Results for orders placed or performed during the hospital encounter of 06/27/22   Echo   Result Value Ref Range    BSA 1.97 m2    LV LATERAL E/E' RATIO 12.44 m/s    LA WIDTH 4.67 cm    TDI LATERAL 0.09 m/s    PV PEAK VELOCITY 0.66 cm/s    LVIDd 5.56 3.5 - 6.0 cm    IVS 1.35 (A) 0.6 - 1.1 cm    Posterior Wall 0.93 0.6 - 1.1 cm    LVIDs 5.00 (A) 2.1 - 4.0 cm    FS 10 28 - 44 %    LA volume 154.10 cm3    Sinus 3.34 cm    STJ 2.41 cm    LV mass 258.53 g    LA size 5.64 cm    RVDD 4.04 cm    TAPSE 1.68 cm    Left Ventricle Relative Wall Thickness 0.33 cm    AV mean gradient 4 mmHg    AV valve area 1.99 cm2    AV Velocity Ratio 0.78     AV index (prosthetic) 0.67     E/A ratio 3.03     E wave  deceleration time 123.22 msec    IVRT 96.89 msec    LVOT diameter 1.95 cm    LVOT area 3.0 cm2    LVOT peak alfie 0.91 m/s    LVOT peak VTI 14.58 cm    Ao peak alfie 1.16 m/s    Ao VTI 21.91 cm    LVOT stroke volume 43.52 cm3    AV peak gradient 5 mmHg    MV Peak E Alfie 1.12 m/s    MV Peak A Alfie 0.37 m/s    LV Systolic Volume 118.11 mL    LV Systolic Volume Index 60.6 mL/m2    LV Diastolic Volume 151.33 mL    LV Diastolic Volume Index 77.61 mL/m2    LA Volume Index 79.0 mL/m2    LV Mass Index 133 g/m2    RA Major Axis 5.79 cm    Left Atrium Minor Axis 7.24 cm    Left Atrium Major Axis 6.56 cm    RA Width 4.88 cm    Right Atrial Pressure (from IVC) 8 mmHg    EF 25 %    Narrative    · The left ventricle is normal in size with severely decreased systolic   function.  · Severe left atrial enlargement.  · The estimated ejection fraction is 25%.  · Grade III left ventricular diastolic dysfunction.  · Mild right ventricular enlargement.  · Mild right atrial enlargement.  · Intermediate central venous pressure (8 mmHg).        Assessment and Plan:     * Acute combined systolic and diastolic congestive heart failure  Patient is identified as having Combined Systolic and Diastolic heart failure that is Acute. CHF is currently uncontrolled. Latest ECHO performed and demonstrates- Results for orders placed during the hospital encounter of 06/27/22    Echo    Interpretation Summary  · The left ventricle is normal in size with severely decreased systolic function.  · Severe left atrial enlargement.  · The estimated ejection fraction is 25%.  · Grade III left ventricular diastolic dysfunction.  · Mild right ventricular enlargement.  · Mild right atrial enlargement.  · Intermediate central venous pressure (8 mmHg).  . Continue Furosemide and monitor clinical status closely. Monitor on telemetry. Patient is on CHF pathway.  Monitor strict Is&Os and daily weights.  Place on fluid restriction of 1.5 L. Continue to stress to patient  importance of self efficacy and  on diet for CHF. Last BNP reviewed- and noted below   Recent Labs   Lab 06/27/22  0610   *   .      Once patient euvolemic, plan for cardiac catheterization for ischemic evaluation.  Potentially in a.m..    Tobacco abuse  Used to be a 2-3 pack a day smoker.  Quit a few years ago.   Currently smokes 1-2 cigars a day.     Dyspnea on exertion        Chronic obstructive pulmonary disease with acute exacerbation            VTE Risk Mitigation (From admission, onward)         Ordered     enoxaparin injection 40 mg  Daily         06/27/22 1023     IP VTE LOW RISK PATIENT  Once         06/27/22 0839     Place REVA hose  Until discontinued         06/27/22 0839                Thank you for your consult. I will follow-up with patient. Please contact us if you have any additional questions.    Artemio Armstrong MD  Cardiology   [facility]

## 2022-06-27 NOTE — H&P
[facility] [place] [place] [facility]  [facility] Medicine  History & Physical    Patient Name: Allie Grubbs  MRN: [id number]  Patient Class: IP- Inpatient  Admission Date: 6/27/2022  Attending Physician:Dionisio Johansen    Primary Care Provider: Primary Doctor No         Patient information was obtained from patient and ER records.     Subjective:     Principal Problem:Acute combined systolic and diastolic congestive heart failure    Chief Complaint:   Chief Complaint   Patient presents with    Shortness of Breath    Weakness     Pt presents to ED c/o sob and weakness x 2 weeks progressively worsened today.  Pt reports coughing and chest congestion. Denies cp, ha, dizziness, numbness or tingling.  Denies asthma or COPD.  Pain 0/10.         HPI:   51-year-old male with a reported past medical history of tobacco abuse for over 30 years,presents with shortness of breath, weakness x2 weeks.  Patient reports his shortness of breath has gotten a lot worse, states that he feels like cannot get a full breath in.  Reports coughing up some frothy looking sputum, states that he cannot lay down flat or on his left side.  Reports no significant swelling in his legs, states he feels exhausted when walking short distances and has to stop to catch his breath.  He reports continuing to smoke and has noticed that he has been wheezing lately.  He denies any fevers/chills, abdominal pain, nausea/vomiting/diarrhea, or any further associated symptoms.patient has severe dyspnea on exertion,he has BNP of 894 and chest X ray pulmonary vascular congestion,patient received IV solumedrol,nebulizer and IV lasix with improvement in his symptoms.      History reviewed. No pertinent past medical history.    History reviewed. No pertinent surgical history.    Review of patient's allergies indicates:   Allergen Reactions    Penicillins Rash       No current facility-administered medications on file prior to encounter.     No current outpatient  medications on file prior to encounter.     Family History    None       Tobacco Use    Smoking status: Current Every Day Smoker    Smokeless tobacco: Never Used   Substance and Sexual Activity    Alcohol use: No    Drug use: No    Sexual activity: Not on file     Review of Systems   Constitutional:  Positive for activity change and appetite change.   HENT:  Negative for congestion and dental problem.    Eyes:  Negative for discharge and itching.   Respiratory:  Positive for shortness of breath.    Cardiovascular:  Negative for chest pain and leg swelling.   Gastrointestinal:  Negative for abdominal distention and abdominal pain.   Endocrine: Negative for cold intolerance and heat intolerance.   Genitourinary:  Negative for difficulty urinating and dysuria.   Musculoskeletal:  Negative for arthralgias and back pain.   Skin:  Negative for color change and pallor.   Allergic/Immunologic: Negative for food allergies.   Neurological:  Negative for dizziness and facial asymmetry.   Hematological:  Negative for adenopathy. Does not bruise/bleed easily.   Psychiatric/Behavioral:  Negative for agitation and behavioral problems.    Objective:     Vital Signs (Most Recent):  Temp: 97.9 °F (36.6 °C) (06/27/22 0531)  Pulse: 94 (06/27/22 1002)  Resp: 14 (06/27/22 1002)  BP: (!) 146/103 (06/27/22 1002)  SpO2: 96 % (06/27/22 1002)   Vital Signs (24h Range):  Temp:  [97.9 °F (36.6 °C)] 97.9 °F (36.6 °C)  Pulse:  [] 94  Resp:  [14-23] 14  SpO2:  [93 %-97 %] 96 %  BP: (146-162)/(101-129) 146/103     Weight: 79.4 kg (175 lb)  Body mass index is 25.84 kg/m².    Physical Exam  Constitutional:       Appearance: Normal appearance.   HENT:      Head: Normocephalic and atraumatic.      Nose: Nose normal.      Mouth/Throat:      Mouth: Mucous membranes are dry.   Eyes:      Extraocular Movements: Extraocular movements intact.      Pupils: Pupils are equal, round, and reactive to light.   Cardiovascular:      Rate and Rhythm:  Normal rate and regular rhythm.   Pulmonary:      Effort: Pulmonary effort is normal.      Breath sounds: Rales present.   Abdominal:      General: Abdomen is flat. There is no distension.      Palpations: Abdomen is soft.      Tenderness: There is no abdominal tenderness.   Musculoskeletal:         General: No swelling or deformity.      Cervical back: Normal range of motion and neck supple.   Skin:     General: Skin is warm.      Coloration: Skin is not jaundiced.      Findings: No bruising.   Neurological:      Mental Status: He is alert and oriented to person, place, and time.      Cranial Nerves: No cranial nerve deficit.   Psychiatric:         Mood and Affect: Mood normal.         Behavior: Behavior normal.         CRANIAL NERVES     CN III, IV, VI   Pupils are equal, round, and reactive to light.     Significant Labs: All pertinent labs within the past 24 hours have been reviewed.  BMP:   Recent Labs   Lab 06/27/22  0610   *      K 3.8      CO2 22*   BUN 12   CREATININE 0.9   CALCIUM 9.4   MG 2.0     CBC:   Recent Labs   Lab 06/27/22  0610   WBC 12.14   HGB 15.6   HCT 46.2   *     CMP:   Recent Labs   Lab 06/27/22  0610      K 3.8      CO2 22*   *   BUN 12   CREATININE 0.9   CALCIUM 9.4   PROT 7.4   ALBUMIN 3.7   BILITOT 0.6   ALKPHOS 100   AST 13   ALT 13   ANIONGAP 11   EGFRNONAA >60       Significant Imaging: I have reviewed all pertinent imaging results/findings within the past 24 hours.    Assessment/Plan:     * Acute combined systolic and diastolic congestive heart failure  .patient has severe dyspnea on exertion,he has BNP of 894 and chest X ray pulmonary vascular congestion,recieved IV lasix,echo is pending,started on CHF protocol.      Tobacco abuse  Consulted over 6 minutes need quit  smoking.      Dyspnea on exertion  Duo to CHF and COPD,will treat underlying cause.      Chronic obstructive pulmonary disease with acute exacerbation  Started on nebulizer  and steroids.        VTE Risk Mitigation (From admission, onward)         Ordered     enoxaparin injection 40 mg  Daily         06/27/22 1023     IP VTE LOW RISK PATIENT  Once         06/27/22 0839     Place REVA hose  Until discontinued         06/27/22 0839                   Dionisio Johansen MD  Department of Hospital Medicine   [facility] - Emergency Dept

## 2022-06-28 NOTE — DISCHARGE SUMMARY
[facility] Medicine  Discharge Summary      Patient Name: Allie Grubbs  MRN: [id number]  Patient Class: IP- Inpatient  Admission Date: 6/27/2022  Hospital Length of Stay: 1 days  Discharge Date and Time:  06/28/2022 10:40 AM  Attending Physician: No att. providers found   Discharging Provider: Dionisio Johansen MD  Primary Care Provider: Primary Doctor No      HPI:     51-year-old male with a reported past medical history of tobacco abuse for over 30 years,presents with shortness of breath, weakness x2 weeks.  Patient reports his shortness of breath has gotten a lot worse, states that he feels like cannot get a full breath in.  Reports coughing up some frothy looking sputum, states that he cannot lay down flat or on his left side.  Reports no significant swelling in his legs, states he feels exhausted when walking short distances and has to stop to catch his breath.  He reports continuing to smoke and has noticed that he has been wheezing lately.  He denies any fevers/chills, abdominal pain, nausea/vomiting/diarrhea, or any further associated symptoms.patient has severe dyspnea on exertion,he has BNP of 894 and chest X ray pulmonary vascular congestion,patient received IV solumedrol,nebulizer and IV lasix with improvement in his symptoms.      * No surgery found *      Hospital Course:   51-year-old male with a reported past medical history of tobacco abuse for over 30 years,presents with shortness of breath, weakness x2 weeks.  Patient reports his shortness of breath has gotten a lot worse, states that he feels like cannot get a full breath in.  Reports coughing up some frothy looking sputum, states that he cannot lay down flat or on his left side.  Reports no significant swelling in his legs, states he feels exhausted when walking short distances and has to stop to catch his breath.  He reports continuing to smoke and has noticed that he has been wheezing lately.  He denies any  fevers/chills, abdominal pain, nausea/vomiting/diarrhea, or any further associated symptoms.patient has severe dyspnea on exertion,he has BNP of 894 and chest X ray pulmonary vascular congestion,patient received IV solumedrol,nebulizer and IV lasix with improvement in his symptoms,echo show EF 0f 25% with diastolic CHF,cardiology was  consulted and was planing doing LHC,but unfortunately patient left AMA because he was not allowed to smoke.       Goals of Care Treatment Preferences:  Code Status: Full Code      Consults:   Consults (From admission, onward)        Status Ordering Provider     Inpatient consult to Registered Dietitian/Nutritionist  Once        Provider:  (Not yet assigned)    Completed SONIDO GONSALVES     Inpatient consult to Cardiology  Once        Provider:  Artemio Armstrong MD    Completed LINDA JACKSON          No new Assessment & Plan notes have been filed under this hospital service since the last note was generated.  Service: Hospital Medicine    Final Active Diagnoses:    Diagnosis Date Noted POA    PRINCIPAL PROBLEM:  Acute combined systolic and diastolic congestive heart failure [I50.41] 06/27/2022 Yes    Chronic obstructive pulmonary disease with acute exacerbation [J44.1] 06/27/2022 Yes    Dyspnea on exertion [R06.00] 06/27/2022 Yes    Tobacco abuse [Z72.0] 06/27/2022 Yes      Problems Resolved During this Admission:       Discharged Condition: stable    Disposition: Left Against Medical Adv*    Follow Up:    Patient Instructions:   No discharge procedures on file.    Significant Diagnostic Studies: Labs:   BMP:   Recent Labs   Lab 06/27/22  0610   *      K 3.8      CO2 22*   BUN 12   CREATININE 0.9   CALCIUM 9.4   MG 2.0   , CMP   Recent Labs   Lab 06/27/22  0610      K 3.8      CO2 22*   *   BUN 12   CREATININE 0.9   CALCIUM 9.4   PROT 7.4   ALBUMIN 3.7   BILITOT 0.6   ALKPHOS 100   AST 13   ALT 13   ANIONGAP 11   ESTGFRAFRICA >60    EGFRNONAA >60    and CBC   Recent Labs   Lab 06/27/22  0610   WBC 12.14   HGB 15.6   HCT 46.2   *     Radiology: X-Ray: CXR: X-Ray Chest 1 View (CXR): No results found for this visit on 06/27/22. and X-Ray Chest PA and Lateral (CXR): No results found for this visit on 06/27/22.  Cardiac Graphics: Echocardiogram:   2D echo with color flow doppler: No results found for this or any previous visit. and Transthoracic echo (TTE) complete (Cupid Only):   Results for orders placed or performed during the hospital encounter of 06/27/22   Echo   Result Value Ref Range    BSA 1.97 m2    LV LATERAL E/E' RATIO 12.44 m/s    LA WIDTH 4.67 cm    TDI LATERAL 0.09 m/s    PV PEAK VELOCITY 0.66 cm/s    LVIDd 5.56 3.5 - 6.0 cm    IVS 1.35 (A) 0.6 - 1.1 cm    Posterior Wall 0.93 0.6 - 1.1 cm    LVIDs 5.00 (A) 2.1 - 4.0 cm    FS 10 28 - 44 %    LA volume 154.10 cm3    Sinus 3.34 cm    STJ 2.41 cm    LV mass 258.53 g    LA size 5.64 cm    RVDD 4.04 cm    TAPSE 1.68 cm    Left Ventricle Relative Wall Thickness 0.33 cm    AV mean gradient 4 mmHg    AV valve area 1.99 cm2    AV Velocity Ratio 0.78     AV index (prosthetic) 0.67     E/A ratio 3.03     E wave deceleration time 123.22 msec    IVRT 96.89 msec    LVOT diameter 1.95 cm    LVOT area 3.0 cm2    LVOT peak alfie 0.91 m/s    LVOT peak VTI 14.58 cm    Ao peak alfie 1.16 m/s    Ao VTI 21.91 cm    LVOT stroke volume 43.52 cm3    AV peak gradient 5 mmHg    MV Peak E Alfie 1.12 m/s    MV Peak A Alfie 0.37 m/s    LV Systolic Volume 118.11 mL    LV Systolic Volume Index 60.6 mL/m2    LV Diastolic Volume 151.33 mL    LV Diastolic Volume Index 77.61 mL/m2    LA Volume Index 79.0 mL/m2    LV Mass Index 133 g/m2    RA Major Axis 5.79 cm    Left Atrium Minor Axis 7.24 cm    Left Atrium Major Axis 6.56 cm    RA Width 4.88 cm    Right Atrial Pressure (from IVC) 8 mmHg    EF 25 %    Narrative    · The left ventricle is normal in size with severely decreased systolic   function.  · Severe left atrial  enlargement.  · The estimated ejection fraction is 25%.  · Grade III left ventricular diastolic dysfunction.  · Mild right ventricular enlargement.  · Mild right atrial enlargement.  · Intermediate central venous pressure (8 mmHg).          Pending Diagnostic Studies:     None         Medications:  Reconciled Home Medications:      Medication List      ASK your doctor about these medications    EXCEDRIN EXTRA STRENGTH 250-250-65 mg per tablet  Generic drug: aspirin-acetaminophen-caffeine 250-250-65 mg  Take 1 tablet by mouth every 6 (six) hours as needed for Pain.            Indwelling Lines/Drains at time of discharge:   Lines/Drains/Airways     None                 Time spent on the discharge of patient: over 30  minutes         Dionisio Johansen MD  Department of Hospital Medicine  [facility] Surg

## 2022-06-28 NOTE — HOSPITAL COURSE
51-year-old male with a reported past medical history of tobacco abuse for over 30 years,presents with shortness of breath, weakness x2 weeks.  Patient reports his shortness of breath has gotten a lot worse, states that he feels like cannot get a full breath in.  Reports coughing up some frothy looking sputum, states that he cannot lay down flat or on his left side.  Reports no significant swelling in his legs, states he feels exhausted when walking short distances and has to stop to catch his breath.  He reports continuing to smoke and has noticed that he has been wheezing lately.  He denies any fevers/chills, abdominal pain, nausea/vomiting/diarrhea, or any further associated symptoms.patient has severe dyspnea on exertion,he has BNP of 894 and chest X ray pulmonary vascular congestion,patient received IV solumedrol,nebulizer and IV lasix with improvement in his symptoms,echo show EF 0f 25% with diastolic CHF,cardiology was  consulted and was planing doing LHC,but unfortunately patient left AMA because he was not allowed to smoke.

## 2022-12-22 ENCOUNTER — TELEPHONE (OUTPATIENT)
Dept: FAMILY MEDICINE | Facility: CLINIC | Age: 52
End: 2022-12-22
Payer: COMMERCIAL

## 2022-12-22 NOTE — TELEPHONE ENCOUNTER
----- Message from Madelin Cash sent at 12/22/2022  2:55 PM CST -----  Regarding: Appt  Contact: [phone]  Anne mom May is calling. Mom would like to schedule her son appt with Dr Mathis. Please call [phone]    Thank You

## 2025-04-08 ENCOUNTER — TELEPHONE (OUTPATIENT)
Dept: OPTOMETRY | Facility: CLINIC | Age: 55
End: 2025-04-08
Payer: COMMERCIAL